# Patient Record
Sex: MALE | Race: WHITE | NOT HISPANIC OR LATINO | URBAN - NONMETROPOLITAN AREA
[De-identification: names, ages, dates, MRNs, and addresses within clinical notes are randomized per-mention and may not be internally consistent; named-entity substitution may affect disease eponyms.]

---
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
## 2024-11-12 ENCOUNTER — COMPREHENSIVE EXAM (OUTPATIENT)
Dept: RURAL CLINIC 2 | Facility: CLINIC | Age: 19
End: 2024-11-12

## 2024-11-12 DIAGNOSIS — H52.13: ICD-10-CM

## 2024-11-12 PROCEDURE — 92310-1 LEVEL 1 SOFT LENS UPDATE

## 2024-11-12 PROCEDURE — S0621 ROUTINE OPHTHALMOLOGICAL EXA: HCPCS
